# Patient Record
Sex: MALE | Race: WHITE | NOT HISPANIC OR LATINO | Employment: FULL TIME | URBAN - METROPOLITAN AREA
[De-identification: names, ages, dates, MRNs, and addresses within clinical notes are randomized per-mention and may not be internally consistent; named-entity substitution may affect disease eponyms.]

---

## 2017-04-20 ENCOUNTER — OFFICE VISIT (OUTPATIENT)
Dept: UROLOGY | Facility: CLINIC | Age: 42
End: 2017-04-20
Payer: COMMERCIAL

## 2017-04-20 VITALS
HEIGHT: 70 IN | BODY MASS INDEX: 32.82 KG/M2 | HEART RATE: 89 BPM | SYSTOLIC BLOOD PRESSURE: 140 MMHG | DIASTOLIC BLOOD PRESSURE: 94 MMHG | WEIGHT: 229.25 LBS

## 2017-04-20 DIAGNOSIS — N28.89 LEFT RENAL MASS: ICD-10-CM

## 2017-04-20 PROCEDURE — 99999 PR PBB SHADOW E&M-NEW PATIENT-LVL III: CPT | Mod: PBBFAC,,, | Performed by: UROLOGY

## 2017-04-20 PROCEDURE — 99203 OFFICE O/P NEW LOW 30 MIN: CPT | Mod: S$GLB,,, | Performed by: UROLOGY

## 2017-04-20 PROCEDURE — 1160F RVW MEDS BY RX/DR IN RCRD: CPT | Mod: S$GLB,,, | Performed by: UROLOGY

## 2017-04-20 RX ORDER — ALPRAZOLAM 0.5 MG/1
0.5 TABLET ORAL
COMMUNITY
Start: 2013-12-30

## 2017-04-20 RX ORDER — LORATADINE 10 MG/1
10 TABLET ORAL
COMMUNITY
Start: 2016-06-15

## 2017-04-20 RX ORDER — TESTOSTERONE CYPIONATE 200 MG/ML
INJECTION, SOLUTION INTRAMUSCULAR
COMMUNITY
Start: 2016-08-10

## 2017-04-20 RX ORDER — FLUTICASONE PROPIONATE 50 MCG
1 SPRAY, SUSPENSION (ML) NASAL
COMMUNITY
Start: 2016-06-15

## 2017-04-20 RX ORDER — LEVOTHYROXINE SODIUM 50 UG/1
50 TABLET ORAL
COMMUNITY
Start: 2016-12-12

## 2017-04-20 RX ORDER — AMLODIPINE BESYLATE 5 MG/1
5 TABLET ORAL
COMMUNITY
Start: 2016-12-12

## 2017-04-20 RX ORDER — HYDROGEN PEROXIDE 3 %
20 SOLUTION, NON-ORAL MISCELLANEOUS
COMMUNITY
Start: 2016-12-12

## 2017-04-20 RX ORDER — ACETYLCYSTEINE 200 MG/ML
SOLUTION ORAL; RESPIRATORY (INHALATION)
Refills: 0 | COMMUNITY
Start: 2017-02-14

## 2017-04-20 NOTE — MR AVS SNAPSHOT
[facility]  [address]  [facility] [address]  Phone: [phone]                  Ben Antonio   2017 3:00 PM   Office Visit    Description:  Male : 1975   Provider:  Mehran Ward MD   Department:  [facility]           Diagnoses this Visit        Comments    Left renal mass                To Do List           Future Appointments        Provider Department Dept Phone    10/24/2017 8:45 AM [facility] ALL [facility] [phone]    10/24/2017 11:00 AM Mehran Ward MD [facility] [phone]      Goals (5 Years of Data)     None      Follow-Up and Disposition     Return in about 6 months (around 10/20/2017).    Follow-up and Disposition History      [facility]     [facility] -  Assistance  Unless otherwise directed by your provider, please contact [facility], our nurse care line that is available for  assistance.     Registered nurses in the [facility] provide: appointment scheduling, clinical advisement, health education, and other advisory services.  Call: [phone] (toll free)               Medications           Message regarding Medications     Verify the changes and/or additions to your medication regime listed below are the same as discussed with your clinician today.  If any of these changes or additions are incorrect, please notify your healthcare provider.             Verify that the below list of medications is an accurate representation of the medications you are currently taking.  If none reported, the list may be blank. If incorrect, please contact your healthcare provider. Carry this list with you in case of emergency.           Current Medications     acetylcysteine 200 mg/ml, 20%, (MUCOMYST) 200 mg/mL (20 %) nebulizer solution take THREE MILLILITERS BY MOUTH TWICE DAILY for FOUR doses. start day prior to ct scan    alprazolam (XANAX) 0.5 MG  "tablet Take 0.5 mg by mouth.    amlodipine (NORVASC) 5 MG tablet Take 5 mg by mouth.    esomeprazole (NEXIUM) 20 MG capsule Take 20 mg by mouth.    fluticasone (FLONASE) 50 mcg/actuation nasal spray 1 spray by Nasal route.    levothyroxine (SYNTHROID) 50 MCG tablet Take 50 mcg by mouth.    loratadine (CLARITIN) 10 mg tablet Take 10 mg by mouth.    testosterone cypionate (DEPOTESTOTERONE CYPIONATE) 200 mg/mL injection inject ONE MILLILITER INTRAMUSCULARLY EVERY TEN days           Clinical Reference Information           Your Vitals Were     BP Pulse Height Weight BMI    140/94 (BP Location: Left arm, Patient Position: Sitting, BP Method: Automatic) 89 5' 10" (1.778 m) 104 kg (229 lb 4.5 oz) 32.9 kg/m2      Blood Pressure          Most Recent Value    BP  (!)  140/94      Allergies as of 4/20/2017     No Known Allergies      Immunizations Administered on Date of Encounter - 4/20/2017     None      Orders Placed During Today's Visit     Future Labs/Procedures Expected by Expires    US Kidney Only  10/17/2017 4/20/2018      [facility] Sign-Up     Activating your [facility] account is as easy as 1-2-3!     1) Visit [email or web address], select Sign Up Now, enter this activation code and your date of birth, then select Next.  [email or web address]  Expires: 6/4/2017  3:59 PM      2) Create a username and password to use when you visit [facility] in the future and select a security question in case you lose your password and select Next.    3) Enter your e-mail address and click Sign Up!    Additional Information  If you have questions, please e-mail [email or web address] or call [phone] to talk to our [facility] staff. Remember, [facility] is NOT to be used for urgent needs. For medical emergencies, dial 911.         Language Assistance Services     ATTENTION: Language assistance services are available, free of charge. Please call [phone].      ATENCIÓN: Si habla español, tiene a nichols disposición servicios gratuitos de " asistencia lingüística. israel al [phone].     MARCEL Ý: N?u b?n nói Ti?ng Vi?t, có các d?ch v? h? tr? ngôn ng? mi?n phí dành cho b?n. G?i s? [phone].         [facility] complies with applicable Federal civil rights laws and does not discriminate on the basis of race, color, national origin, age, disability, or sex.

## 2017-04-20 NOTE — PROGRESS NOTES
Clinic Note  4/20/2017      Subjective:       Patient ID:  Ben is a 41 y.o. male being seen for an new visit.      Chief Complaint:   HPI     Ben Antonio is a 41 y.o. male who had a renal sonogram on 12/18/16 for CKD 1which suggested a left renal mass. Had a subsequent CT scan on 2/16/17 which did not suggest a renal mass. Reviewed all images. I suspect that sonogram had a false positive due to fetal lobulation. CT carefully reviewed I see no evidence of a renal mass.  Creatinine was 1.3 in 2008, 1.4 6/26/16.    Past Medical History:   Diagnosis Date    Allergy      No family history on file.  Social History     Social History    Marital status:      Spouse name: N/A    Number of children: N/A    Years of education: N/A     Occupational History    Not on file.     Social History Main Topics    Smoking status: Never Smoker    Smokeless tobacco: Not on file    Alcohol use No    Drug use: No    Sexual activity: Not on file     Other Topics Concern    Not on file     Social History Narrative    No narrative on file     Past Surgical History:   Procedure Laterality Date    ANTERIOR CRUCIATE LIGAMENT REPAIR       Patient Active Problem List   Diagnosis    Left renal mass     Review of Systems   Constitutional: Negative for appetite change, chills, fatigue, fever and unexpected weight change.   HENT: Negative for nosebleeds.    Respiratory: Negative for shortness of breath and wheezing.    Cardiovascular: Negative for chest pain, palpitations and leg swelling.   Gastrointestinal: Negative for abdominal distention, abdominal pain, constipation, diarrhea, nausea and vomiting.   Genitourinary: Negative for difficulty urinating, dysuria and hematuria.   Musculoskeletal: Negative for arthralgias and back pain.   Skin: Negative for pallor.   Neurological: Negative for dizziness, seizures and syncope.   Hematological: Negative for adenopathy.   Psychiatric/Behavioral: Negative for dysphoric mood.        "  Objective:      BP (!) 140/94 (BP Location: Left arm, Patient Position: Sitting, BP Method: Automatic)  Pulse 89  Ht 5' 10" (1.778 m)  Wt 104 kg (229 lb 4.5 oz)  BMI 32.9 kg/m2  Estimated body mass index is 32.9 kg/(m^2) as calculated from the following:    Height as of this encounter: 5' 10" (1.778 m).    Weight as of this encounter: 104 kg (229 lb 4.5 oz).  Physical Exam   Constitutional: He is oriented to person, place, and time. He appears well-developed and well-nourished. No distress.   HENT:   Head: Atraumatic.   Neck: No tracheal deviation present.   Cardiovascular: Normal rate.    Pulmonary/Chest: Effort normal. No respiratory distress. He has no wheezes.   Abdominal: Soft. Bowel sounds are normal. He exhibits no distension and no mass. There is no tenderness. There is no rebound and no guarding.   Neurological: He is alert and oriented to person, place, and time.   Skin: Skin is warm and dry. He is not diaphoretic.     Psychiatric: He has a normal mood and affect. His behavior is normal. Judgment and thought content normal.         Assessment and Plan:           Problem List Items Addressed This Visit     Left renal mass          Follow up:   Recommend repeat renal sonogram in 6 months.    Mehran Ward      "

## 2017-10-20 NOTE — TELEPHONE ENCOUNTER
Called pt who said he had to cx his appt due to work and that he woud call back to schedule when he got his schedule.

## 2017-10-23 ENCOUNTER — TELEPHONE (OUTPATIENT)
Dept: UROLOGY | Facility: CLINIC | Age: 42
End: 2017-10-23

## 2017-10-23 NOTE — TELEPHONE ENCOUNTER
----- Message from Yaritza Gonzalez RN sent at 10/20/2017  1:45 PM CDT -----  Contact: self  Will call back to reschedule when gets schedule. Thanks,June  ----- Message -----  From: Claudia Love  Sent: 10/20/2017   1:19 PM  To: [facility] Staff    Pt is calling in regards to cancelling both of his appts on 10/24/17.  Pt would like a call back in regards to this matter.    Pt can be reached at [phone].    Thank you